# Patient Record
Sex: MALE | Race: BLACK OR AFRICAN AMERICAN | NOT HISPANIC OR LATINO
[De-identification: names, ages, dates, MRNs, and addresses within clinical notes are randomized per-mention and may not be internally consistent; named-entity substitution may affect disease eponyms.]

---

## 2019-03-14 ENCOUNTER — APPOINTMENT (OUTPATIENT)
Dept: ORTHOPEDIC SURGERY | Facility: CLINIC | Age: 29
End: 2019-03-14
Payer: COMMERCIAL

## 2019-03-14 VITALS
HEART RATE: 90 BPM | DIASTOLIC BLOOD PRESSURE: 83 MMHG | HEIGHT: 75 IN | BODY MASS INDEX: 24.87 KG/M2 | WEIGHT: 200 LBS | SYSTOLIC BLOOD PRESSURE: 140 MMHG

## 2019-03-14 DIAGNOSIS — F19.90 OTHER PSYCHOACTIVE SUBSTANCE USE, UNSPECIFIED, UNCOMPLICATED: ICD-10-CM

## 2019-03-14 DIAGNOSIS — Z78.9 OTHER SPECIFIED HEALTH STATUS: ICD-10-CM

## 2019-03-14 DIAGNOSIS — M89.9 DISORDER OF BONE, UNSPECIFIED: ICD-10-CM

## 2019-03-14 PROCEDURE — 73130 X-RAY EXAM OF HAND: CPT | Mod: RT

## 2019-03-14 PROCEDURE — 99203 OFFICE O/P NEW LOW 30 MIN: CPT

## 2019-03-14 NOTE — HISTORY OF PRESENT ILLNESS
[Pain Location] : pain [] : right middle finger [de-identified] : This is a 28 year old male here for evaluation of his right long finger. He reports that two years ago he jammed the finger while playing basketball. He did not seek any treatment at that time and his pain subsided on its own. Currently, he reports no pain. He denies any feelings of instability. He has no trouble lifting. He reports mild stiffness in the PIP joint. He states the finger "just feels weird". He could not elaborate on what he was feeling in the finger. He is able to work out at the gym and lift heavy weight without any pain or complication. He simply reports the finger "feels off".

## 2019-03-14 NOTE — DISCUSSION/SUMMARY
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 Diagnosis was discussed with the patient and Xray images were reviewed with him as well. He will go for a CT with and without IV contrast to evaluate for possible osteochondroma. He will follow up with  after the CT for further evaluation. All of his questions were answered, he understands and agrees.

## 2019-03-14 NOTE — PHYSICAL EXAM
[Normal] : Gait: normal [de-identified] : RIGHT wrist range of motion flexion 80 degrees, extension 60 degrees. Ulnar deviation 30 degrees. Radial deviation 20 degrees. There is no tenderness about the distal radius or ulna. Carpal bones are nontender. CMCs non tender. Metacarpals nontender. MCP nontender and with FROM. \par There is swelling about the 3rd PIP with palpable mass noted medially. No tenderness. Mildly limited ROM at the PIP. no instability. \par Remaining PIP/DIP joints non tender with FROM. No rotational deformity.  A1 pulleys are non tender. \par  strength intact. Normal pincer . Normal thumb opposition. \par No tenderness about the 1st dorsal compartment. Negative Finkelstein's test. \par No thenar atrophy. Negative phalens. Negative Tinnels. \par Basal joint is non tender. Negative grind testing. \par Skin is intact without rashes, erythema, warmth. \par 2+ pulses. Normal capillary refill. Sensation is intact to light touch. \par  [de-identified] : 4 views of the right 3rd finger ordered and interpreted by myself demonstrate no acute fracture or dislocation. There is evidence of bony proliferation about the 3rd proximal phalanx, possible osteochondroma.

## 2019-03-18 PROBLEM — M89.9 BONE MASS: Status: ACTIVE | Noted: 2019-03-14

## 2021-05-05 ENCOUNTER — TRANSCRIPTION ENCOUNTER (OUTPATIENT)
Age: 31
End: 2021-05-05

## 2022-01-23 ENCOUNTER — OUTPATIENT (OUTPATIENT)
Dept: OUTPATIENT SERVICES | Facility: HOSPITAL | Age: 32
LOS: 1 days | End: 2022-01-23
Payer: COMMERCIAL

## 2022-01-23 DIAGNOSIS — Z11.52 ENCOUNTER FOR SCREENING FOR COVID-19: ICD-10-CM

## 2022-01-24 LAB — SARS-COV-2 RNA SPEC QL NAA+PROBE: SIGNIFICANT CHANGE UP

## 2022-01-24 PROCEDURE — C9803: CPT

## 2022-01-24 PROCEDURE — U0005: CPT

## 2022-01-24 PROCEDURE — U0003: CPT

## 2024-05-15 ENCOUNTER — NON-APPOINTMENT (OUTPATIENT)
Age: 34
End: 2024-05-15

## 2025-03-04 ENCOUNTER — NON-APPOINTMENT (OUTPATIENT)
Age: 35
End: 2025-03-04